# Patient Record
Sex: MALE | Race: WHITE | Employment: FULL TIME | URBAN - NONMETROPOLITAN AREA
[De-identification: names, ages, dates, MRNs, and addresses within clinical notes are randomized per-mention and may not be internally consistent; named-entity substitution may affect disease eponyms.]

---

## 2020-02-15 ENCOUNTER — HOSPITAL ENCOUNTER (EMERGENCY)
Age: 25
Discharge: HOME | End: 2020-02-15
Payer: COMMERCIAL

## 2020-02-15 VITALS
BODY MASS INDEX: 28 KG/M2 | SYSTOLIC BLOOD PRESSURE: 153 MMHG | HEART RATE: 85 BPM | TEMPERATURE: 98.2 F | HEIGHT: 71 IN | WEIGHT: 200 LBS | OXYGEN SATURATION: 99 % | DIASTOLIC BLOOD PRESSURE: 84 MMHG | RESPIRATION RATE: 18 BRPM

## 2020-02-15 PROCEDURE — 6360000002 HC RX W HCPCS: Performed by: NURSE PRACTITIONER

## 2020-02-15 PROCEDURE — 99202 OFFICE O/P NEW SF 15 MIN: CPT

## 2020-02-15 PROCEDURE — 96372 THER/PROPH/DIAG INJ SC/IM: CPT

## 2020-02-15 PROCEDURE — 99213 OFFICE O/P EST LOW 20 MIN: CPT | Performed by: NURSE PRACTITIONER

## 2020-02-15 RX ORDER — METHYLPREDNISOLONE ACETATE 40 MG/ML
40 INJECTION, SUSPENSION INTRA-ARTICULAR; INTRALESIONAL; INTRAMUSCULAR; SOFT TISSUE ONCE
Status: COMPLETED | OUTPATIENT
Start: 2020-02-15 | End: 2020-02-15

## 2020-02-15 RX ORDER — PREDNISONE 20 MG/1
TABLET ORAL
Qty: 21 TABLET | Refills: 0 | Status: SHIPPED | OUTPATIENT
Start: 2020-02-15 | End: 2020-05-19 | Stop reason: ALTCHOICE

## 2020-02-15 RX ADMIN — METHYLPREDNISOLONE ACETATE 40 MG: 40 INJECTION, SUSPENSION INTRA-ARTICULAR; INTRALESIONAL; INTRAMUSCULAR; SOFT TISSUE at 13:35

## 2020-02-15 ASSESSMENT — PAIN DESCRIPTION - DESCRIPTORS: DESCRIPTORS: BURNING;ITCHING

## 2020-02-15 ASSESSMENT — PAIN DESCRIPTION - LOCATION: LOCATION: ARM

## 2020-02-15 ASSESSMENT — ENCOUNTER SYMPTOMS: COLOR CHANGE: 0

## 2020-02-15 ASSESSMENT — PAIN DESCRIPTION - PAIN TYPE: TYPE: ACUTE PAIN

## 2020-02-15 ASSESSMENT — PAIN SCALES - GENERAL: PAINLEVEL_OUTOF10: 3

## 2020-02-15 ASSESSMENT — PAIN DESCRIPTION - ORIENTATION: ORIENTATION: RIGHT;LEFT

## 2020-02-15 NOTE — ED NOTES
Patient verbalized understanding of discharge instructions and medications prescribed. Denies questions or concerns at this time.       Ziyad Griffith RN  02/15/20 6740

## 2020-02-15 NOTE — ED PROVIDER NOTES
05/08/2007    Varicella (Varivax) 08/16/2013       FAMILY HISTORY     Patient's family history is not on file. SOCIAL HISTORY     Patient  reports that he has never smoked. He has never used smokeless tobacco. He reports current alcohol use. He reports that he does not use drugs. PHYSICAL EXAM     ED TRIAGE VITALS  BP: (!) 153/84, Temp: 98.2 °F (36.8 °C), Pulse: 85, Resp: 18, SpO2: 99 %,Estimated body mass index is 27.89 kg/m² as calculated from the following:    Height as of this encounter: 5' 11\" (1.803 m). Weight as of this encounter: 200 lb (90.7 kg). ,No LMP for male patient. Physical Exam  Constitutional:       General: He is not in acute distress. Appearance: Normal appearance. He is not ill-appearing, toxic-appearing or diaphoretic. Pulmonary:      Effort: Pulmonary effort is normal. No respiratory distress. Musculoskeletal: Normal range of motion. General: No swelling. Skin:     General: Skin is warm. Findings: Rash present. Rash is vesicular. Neurological:      General: No focal deficit present. Mental Status: He is alert and oriented to person, place, and time. Sensory: No sensory deficit. Psychiatric:         Mood and Affect: Mood normal.         Behavior: Behavior normal.         Thought Content: Thought content normal.         Judgment: Judgment normal.         DIAGNOSTIC RESULTS     Labs:No results found for this visit on 02/15/20. IMAGING:    No orders to display     URGENT CARE COURSE:     Vitals:    02/15/20 1320   BP: (!) 153/84   Pulse: 85   Resp: 18   Temp: 98.2 °F (36.8 °C)   TempSrc: Temporal   SpO2: 99%   Weight: 200 lb (90.7 kg)   Height: 5' 11\" (1.803 m)       Medications   methylPREDNISolone acetate (DEPO-MEDROL) injection 40 mg (40 mg Intramuscular Given 2/15/20 1335)            PROCEDURES:  None    FINAL IMPRESSION      1. Poison ivy dermatitis    2.  Rash and other nonspecific skin eruption          DISPOSITION/ PLAN   Patient is discharged home with prescription for prednisone tapering dose for the next 2 weeks that he may begin taking today. Patient was given Depo-Medrol injection while at the urgent care today as rash was starting to spread to face. Patient may take Benadryl with prednisone if there is any difficulty in sleeping due to irritation of rash. If rash has not improved or seems to worsen within the next 2-week should follow-up with primary care provider. Discussed with patient, poison ivy cannot be passed from person to person, however if plant oils are still on clothing, they can have reaction to human skin.         PATIENT REFERRED TO:  Raheel Wynne MD  [address] [facility] / [place] [address]      DISCHARGE MEDICATIONS:  New Prescriptions    PREDNISONE (DELTASONE) 20 MG TABLET    40 mg/ day for week 1, 20 mg / day for week 2       Discontinued Medications    No medications on file       Current Discharge Medication List          KOSTA Donaldson NP    (Please note that portions of this note were completed with a voice recognition program. Efforts were made to edit the dictations but occasionally words are mis-transcribed.)         KOSTA Ross NP  02/15/20 5685

## 2020-02-20 ENCOUNTER — TELEPHONE (OUTPATIENT)
Dept: FAMILY MEDICINE CLINIC | Age: 25
End: 2020-02-20

## 2020-05-19 ENCOUNTER — HOSPITAL ENCOUNTER (EMERGENCY)
Age: 25
Discharge: HOME | End: 2020-05-19
Payer: COMMERCIAL

## 2020-05-19 VITALS
DIASTOLIC BLOOD PRESSURE: 79 MMHG | OXYGEN SATURATION: 99 % | BODY MASS INDEX: 28 KG/M2 | HEIGHT: 71 IN | SYSTOLIC BLOOD PRESSURE: 134 MMHG | TEMPERATURE: 97 F | RESPIRATION RATE: 16 BRPM | WEIGHT: 200 LBS | HEART RATE: 71 BPM

## 2020-05-19 PROCEDURE — 99213 OFFICE O/P EST LOW 20 MIN: CPT | Performed by: NURSE PRACTITIONER

## 2020-05-19 PROCEDURE — 99212 OFFICE O/P EST SF 10 MIN: CPT

## 2020-05-19 RX ORDER — GLYCERIN, HYPROMELLOSES, AND POLYETHYLENE GLYCOL 400 2.5; 2; 11.28 MG/ML; MG/ML; MG/ML
1 LIQUID OPHTHALMIC 2 TIMES DAILY
COMMUNITY
End: 2020-10-12

## 2020-05-19 RX ORDER — GENTAMICIN SULFATE 3 MG/ML
1 SOLUTION/ DROPS OPHTHALMIC
Qty: 5 ML | Refills: 0 | Status: SHIPPED | OUTPATIENT
Start: 2020-05-19 | End: 2020-05-24

## 2020-05-19 ASSESSMENT — ENCOUNTER SYMPTOMS
EYE DISCHARGE: 1
NAUSEA: 0
EYE REDNESS: 1
PHOTOPHOBIA: 0
DIARRHEA: 0
EYE PAIN: 1
VOMITING: 0

## 2020-05-19 ASSESSMENT — VISUAL ACUITY
OD: 20/20
OU: 20/15
OS: 20/15

## 2020-05-19 NOTE — ED PROVIDER NOTES
[address]  Urgent Care Encounter       CHIEF COMPLAINT       Chief Complaint   Patient presents with    Eye Drainage       Nurses Notes reviewed and I agree except as noted in the HPI. HISTORY OF PRESENT ILLNESS   Gregorio Varma is a 21 y.o. male who presents with complaints of left eye discharge mild pain, onset 5 to 6 days ago. This morning, the eye became red and swollen. He is reporting some slightly blurred vision to the left eye but no photophobia and no foreign body sensation. The patient denies any injuries or debris in the left eye. No reports of fever, chills, nausea vomiting or recent illnesses. The history is provided by the patient. REVIEW OF SYSTEMS     Review of Systems   Constitutional: Negative for appetite change, chills and fever. Eyes: Positive for pain, discharge, redness and visual disturbance (Slight blurring of the left eye). Negative for photophobia. Gastrointestinal: Negative for diarrhea, nausea and vomiting. Musculoskeletal:        See HPI   Neurological: Negative for dizziness and headaches. PAST MEDICAL HISTORY         Diagnosis Date    Hx MRSA infection 2011       SURGICALHISTORY     Patient  has no past surgical history on file. CURRENT MEDICATIONS       Previous Medications    GLYCERIN-HYPROMELLOSE- (VISINE DRY EYE) 0.2-0.2-1 % SOLN OPTHALMIC SOLUTION    1 drop 2 times daily       ALLERGIES     Patient is is allergic to pcn [penicillins].     Patients   Immunization History   Administered Date(s) Administered    DTaP 01/31/1995, 04/18/1995, 06/20/1995, 03/19/1996, 04/25/2000    HPV Quadrivalent (Gardasil) 08/16/2013, 11/05/2013, 03/05/2014    Hepatitis B 1994, 01/31/1995, 12/05/1995    Hib, unspecified 01/31/1995, 04/18/1995, 06/20/1995, 03/19/1996    MMR 04/25/1995, 12/05/1995    Meningococcal MCV4P (Menactra) 08/16/2013    Polio IPV (IPOL) 04/18/1995, 06/20/1995, 01/31/1996, 04/25/2000    Tdap (Boostrix, Adacel) 05/08/2007    Varicella (Varivax) 08/16/2013       FAMILY HISTORY     Patient's family history is not on file. SOCIAL HISTORY     Patient  reports that he has never smoked. He has never used smokeless tobacco. He reports current alcohol use. He reports that he does not use drugs. PHYSICAL EXAM     ED TRIAGE VITALS  BP: 134/79, Temp: 97 °F (36.1 °C), Pulse: 71, Resp: 16, SpO2: 99 %,Estimated body mass index is 27.89 kg/m² as calculated from the following:    Height as of this encounter: 5' 11\" (1.803 m). Weight as of this encounter: 200 lb (90.7 kg). ,No LMP for male patient. Physical Exam  Vitals signs and nursing note reviewed. Constitutional:       General: He is not in acute distress. Appearance: He is well-developed. He is not ill-appearing. HENT:      Head: Normocephalic and atraumatic. Eyes:      General: No scleral icterus. Left eye: No foreign body, discharge or hordeolum. Extraocular Movements: Extraocular movements intact. Conjunctiva/sclera:      Left eye: Left conjunctiva is injected. No chemosis, exudate or hemorrhage. Pupils: Pupils are equal, round, and reactive to light. Comments: Swelling of the left upper eyelid with mild erythema   Pulmonary:      Effort: Pulmonary effort is normal. No respiratory distress. Skin:     General: Skin is warm and dry. Neurological:      General: No focal deficit present. Mental Status: He is alert and oriented to person, place, and time. Psychiatric:         Mood and Affect: Mood normal.         Speech: Speech normal.         Behavior: Behavior normal. Behavior is cooperative. DIAGNOSTIC RESULTS     Labs:No results found for this visit on 05/19/20.     IMAGING:    No orders to display         EKG:      URGENT CARE COURSE:     Vitals:    05/19/20 1155   BP: 134/79   Pulse: 71   Resp: 16   Temp: 97 °F (36.1 °C)   TempSrc: Temporal   SpO2: 99%   Weight: 200 lb (90.7 kg)   Height: 5' 11\" (1.803 m)

## 2020-05-21 ENCOUNTER — TELEPHONE (OUTPATIENT)
Dept: FAMILY MEDICINE CLINIC | Age: 25
End: 2020-05-21

## 2020-07-02 LAB
ALBUMIN: 4.9
ALP BLD-CCNC: 65 U/L
ALT SERPL-CCNC: 33 U/L
AST SERPL-CCNC: 29 U/L
AVERAGE GLUCOSE: NORMAL
BILIRUB SERPL-MCNC: 0.51 MG/DL (ref 0.1–1.4)
CHOLESTEROL, TOTAL: 199 MG/DL
CHOLESTEROL/HDL RATIO: 6.63
CREATININE: 1 MG/DL
HBA1C MFR BLD: 5.1 %
HDLC SERPL-MCNC: 30 MG/DL (ref 35–70)
HIV AG/AB: NORMAL
LDL CHOLESTEROL CALCULATED: 131 MG/DL (ref 0–160)
NONHDLC SERPL-MCNC: ABNORMAL MG/DL
TRIGL SERPL-MCNC: 186 MG/DL
VLDLC SERPL CALC-MCNC: ABNORMAL MG/DL

## 2020-10-12 ENCOUNTER — HOSPITAL ENCOUNTER (EMERGENCY)
Age: 25
Discharge: HOME | End: 2020-10-12
Payer: COMMERCIAL

## 2020-10-12 VITALS
SYSTOLIC BLOOD PRESSURE: 138 MMHG | DIASTOLIC BLOOD PRESSURE: 82 MMHG | TEMPERATURE: 97.6 F | HEART RATE: 66 BPM | OXYGEN SATURATION: 97 % | RESPIRATION RATE: 16 BRPM

## 2020-10-12 PROCEDURE — 99213 OFFICE O/P EST LOW 20 MIN: CPT | Performed by: NURSE PRACTITIONER

## 2020-10-12 PROCEDURE — 99212 OFFICE O/P EST SF 10 MIN: CPT

## 2020-10-12 RX ORDER — PREDNISONE 10 MG/1
TABLET ORAL
Qty: 40 TABLET | Refills: 0 | Status: SHIPPED | OUTPATIENT
Start: 2020-10-12 | End: 2020-10-22

## 2020-10-12 RX ORDER — DIPHENHYDRAMINE HCL 25 MG
25 CAPSULE ORAL EVERY 6 HOURS PRN
COMMUNITY
End: 2022-04-05

## 2020-10-12 ASSESSMENT — ENCOUNTER SYMPTOMS
SINUS PRESSURE: 0
EYE DISCHARGE: 0
COLOR CHANGE: 1
EYE INFLAMMATION: 1
THROAT SWELLING: 0
DOUBLE VISION: 0
EYE REDNESS: 1
PERI-ORBITAL EDEMA: 0
RHINORRHEA: 0
EYE WATERING: 0
BLURRED VISION: 0
EYE PAIN: 0
PHOTOPHOBIA: 0

## 2020-10-12 ASSESSMENT — PAIN DESCRIPTION - PAIN TYPE: TYPE: ACUTE PAIN

## 2020-10-12 ASSESSMENT — PAIN SCALES - GENERAL: PAINLEVEL_OUTOF10: 2

## 2020-10-12 ASSESSMENT — PAIN DESCRIPTION - FREQUENCY: FREQUENCY: CONTINUOUS

## 2020-10-12 ASSESSMENT — PAIN DESCRIPTION - ORIENTATION: ORIENTATION: RIGHT

## 2020-10-12 ASSESSMENT — PAIN DESCRIPTION - DESCRIPTORS: DESCRIPTORS: ITCHING

## 2020-10-12 ASSESSMENT — PAIN DESCRIPTION - LOCATION: LOCATION: EYE

## 2020-10-12 NOTE — ED TRIAGE NOTES
Patient walked to room 5 for right eyelid swollen and itching. Patient was cleaning up brush on Saturday and yesterday he woke up with his eye all swollen and itching. No other needs.

## 2020-10-12 NOTE — ED PROVIDER NOTES
[address]  Urgent Care Encounter       CHIEF COMPLAINT       Chief Complaint   Patient presents with    Eye Problem     right eye lid swollen, itching onset yesterday        Nurses Notes reviewed and I agree except as noted in the HPI. HISTORY OF PRESENT ILLNESS   Mariela Castro is a 21 y.o. male who presents in care center complaining of a redness and swelling and itching to the right eyelid as well as both forearms. The patient stated that Saturday he was at his home cleaning up brush and was not wearing any gloves stated he did notice that he had a rash little bit on his forearms and his right eyelid was itching. The patient complains irritation 2 on a 10 scale. The patient has not put any medication on any of the rash did take a Benadryl this morning for itching. The patient stated that he \"gets poison ivy every year\". The patient stated that he has had no drainage to the forearms but to the upper eyelid and it was scaly and might of been a slight drainage on the skin. She denied any visual changes any corrective lenses any sensations of foreign body. The history is provided by the patient. No  was used. Eye Problem   Location:  Right eye  Quality: itchy.   Severity:  Mild  Onset quality:  Sudden  Duration:  1 day  Timing:  Constant  Progression:  Unchanged  Chronicity:  New  Context: not chemical exposure, not contact lens problem, not direct trauma, not foreign body, not scratch and not smoke exposure    Context comment:  Plant contact  Relieved by:  None tried  Worsened by:  Nothing  Ineffective treatments:  None tried  Associated symptoms: inflammation and redness    Associated symptoms: no blurred vision, no decreased vision, no discharge, no double vision, no headaches, no photophobia, no swelling and no tearing    Associated symptoms comment:  Right upper eyelid  Risk factors: no exposure to pinkeye and no previous injury to eye    Rash   Location: Face and shoulder/arm  Facial rash location:  R eyelid  Shoulder/arm rash location:  R forearm and L forearm  Quality: itchiness, redness and scaling    Severity:  Mild  Onset quality:  Sudden  Duration:  1 day  Timing:  Constant  Progression:  Waxing and waning  Chronicity:  New  Context: plant contact    Relieved by:  None tried  Worsened by:  Nothing  Ineffective treatments:  None tried  Associated symptoms: no fever, no headaches, no myalgias, no periorbital edema, no throat swelling and no tongue swelling        REVIEW OF SYSTEMS     Review of Systems   Constitutional: Negative for appetite change, chills and fever. HENT: Negative for congestion, rhinorrhea and sinus pressure. Eyes: Positive for redness. Negative for blurred vision, double vision, photophobia, pain and discharge. Right upper eyelid   Musculoskeletal: Negative for myalgias. Skin: Positive for color change and rash. Right upper eyelid, bilateral forearms   Allergic/Immunologic: Negative for environmental allergies and food allergies. Neurological: Negative for dizziness, facial asymmetry, light-headedness and headaches. Hematological: Negative for adenopathy. PAST MEDICAL HISTORY         Diagnosis Date    Hx MRSA infection 2011       SURGICALHISTORY     Patient  has no past surgical history on file. CURRENT MEDICATIONS       Previous Medications    DIPHENHYDRAMINE (BENADRYL) 25 MG CAPSULE    Take 25 mg by mouth every 6 hours as needed for Itching       ALLERGIES     Patient is is allergic to pcn [penicillins].     Patients   Immunization History   Administered Date(s) Administered    DTaP 01/31/1995, 04/18/1995, 06/20/1995, 03/19/1996, 04/25/2000    HPV Quadrivalent (Gardasil) 08/16/2013, 11/05/2013, 03/05/2014    Hepatitis B 1994, 01/31/1995, 12/05/1995    Hib, unspecified 01/31/1995, 04/18/1995, 06/20/1995, 03/19/1996    MMR 04/25/1995, 12/05/1995    Meningococcal MCV4P (Menactra) 08/16/2013    Polio IPV (IPOL) 04/18/1995, 06/20/1995, 01/31/1996, 04/25/2000    Tdap (Boostrix, Adacel) 05/08/2007    Varicella (Varivax) 08/16/2013       FAMILY HISTORY     Patient's family history is not on file. SOCIAL HISTORY     Patient  reports that he has never smoked. He has never used smokeless tobacco. He reports current alcohol use. He reports that he does not use drugs. PHYSICAL EXAM     ED TRIAGE VITALS  BP: 138/82, Temp: 97.6 °F (36.4 °C), Pulse: 66, Resp: 16, SpO2: 97 %,Estimated body mass index is 27.89 kg/m² as calculated from the following:    Height as of 5/19/20: 5' 11\" (1.803 m). Weight as of 5/19/20: 200 lb (90.7 kg). ,No LMP for male patient. Physical Exam  Vitals signs and nursing note reviewed. Constitutional:       General: He is not in acute distress. Appearance: He is well-developed. He is not ill-appearing, toxic-appearing or diaphoretic. HENT:      Head: Normocephalic. Nose: Nose normal.   Eyes:      General:         Right eye: No discharge or hordeolum. Extraocular Movements: Extraocular movements intact. Pupils: Pupils are equal, round, and reactive to light. Comments: Right upper eyelid   Neck:      Musculoskeletal: Normal range of motion. Cardiovascular:      Rate and Rhythm: Normal rate. Pulmonary:      Effort: Pulmonary effort is normal.   Skin:     General: Skin is warm and dry. Coloration: Skin is not pale. Findings: Erythema and rash present. No abrasion, ecchymosis, laceration or petechiae. Rash is papular and vesicular. Rash is not purpuric, pustular or urticarial.             Comments: Right upper eyelid,bilateral forearms   Neurological:      Mental Status: He is alert and oriented to person, place, and time. Psychiatric:         Mood and Affect: Mood normal.         Behavior: Behavior normal. Behavior is cooperative. DIAGNOSTIC RESULTS     Labs:No results found for this visit on 10/12/20.     IMAGING:    No orders to display         EKG:      URGENT CARE COURSE:     Vitals:    10/12/20 1227   BP: 138/82   Pulse: 66   Resp: 16   Temp: 97.6 °F (36.4 °C)   TempSrc: Temporal   SpO2: 97%       Medications - No data to display         PROCEDURES:  None    FINAL IMPRESSION      1. Allergic contact dermatitis due to plants, except food          DISPOSITION/ PLAN       Take Medication as Directed  Benadryl for itch, Don't scratch  Monitor for any increase in size or spreading  Monitor for fever or chills  Keep drainage covered if any.   Follow up with your PCP or return as needed  Or go to the emergency Department    PATIENT REFERRED TO:  Tami Macedo MD  [address] [facility] / [place] [address]:  New Prescriptions    PREDNISONE (DELTASONE) 10 MG TABLET    40 mg's po x 4 days, then 30 mg's po x 4 days, 20 mg's x 4 days, the 10 mgs po x 4 days       Discontinued Medications    GLYCERIN-HYPROMELLOSE- (VISINE DRY EYE) 0.2-0.2-1 % SOLN OPTHALMIC SOLUTION    1 drop 2 times daily       Current Discharge Medication List          KOSTA Anaya CNP    (Please note that portions of this note were completed with a voice recognition program. Efforts were made to edit the dictations but occasionally words are mis-transcribed.)           KOSTA Anaya CNP  10/12/20 4829

## 2020-10-14 ENCOUNTER — TELEPHONE (OUTPATIENT)
Dept: FAMILY MEDICINE CLINIC | Age: 25
End: 2020-10-14

## 2021-04-06 ENCOUNTER — OFFICE VISIT (OUTPATIENT)
Dept: FAMILY MEDICINE CLINIC | Age: 26
End: 2021-04-06
Payer: COMMERCIAL

## 2021-04-06 VITALS
TEMPERATURE: 97.3 F | WEIGHT: 201.3 LBS | SYSTOLIC BLOOD PRESSURE: 128 MMHG | DIASTOLIC BLOOD PRESSURE: 90 MMHG | BODY MASS INDEX: 28.18 KG/M2 | HEART RATE: 78 BPM | OXYGEN SATURATION: 99 % | HEIGHT: 71 IN

## 2021-04-06 DIAGNOSIS — Z00.00 WELL ADULT EXAM: Primary | ICD-10-CM

## 2021-04-06 PROCEDURE — 99395 PREV VISIT EST AGE 18-39: CPT | Performed by: FAMILY MEDICINE

## 2021-04-06 SDOH — HEALTH STABILITY: MENTAL HEALTH: HOW OFTEN DO YOU HAVE A DRINK CONTAINING ALCOHOL?: 4 OR MORE TIMES A WEEK

## 2021-04-06 ASSESSMENT — ENCOUNTER SYMPTOMS
PHOTOPHOBIA: 0
SHORTNESS OF BREATH: 0
SORE THROAT: 0
ABDOMINAL PAIN: 0
WHEEZING: 0
EYE DISCHARGE: 0
NAUSEA: 0

## 2021-04-06 ASSESSMENT — PATIENT HEALTH QUESTIONNAIRE - PHQ9
SUM OF ALL RESPONSES TO PHQ9 QUESTIONS 1 & 2: 0
SUM OF ALL RESPONSES TO PHQ QUESTIONS 1-9: 0
SUM OF ALL RESPONSES TO PHQ QUESTIONS 1-9: 0

## 2021-04-06 NOTE — PATIENT INSTRUCTIONS
Patient Education        Well Visit, Ages 25 to 48: Care Instructions  Overview     Well visits can help you stay healthy. Your doctor has checked your overall health and may have suggested ways to take good care of yourself. Your doctor also may have recommended tests. At home, you can help prevent illness with healthy eating, regular exercise, and other steps. Follow-up care is a key part of your treatment and safety. Be sure to make and go to all appointments, and call your doctor if you are having problems. It's also a good idea to know your test results and keep a list of the medicines you take. How can you care for yourself at home? · Get screening tests that you and your doctor decide on. Screening helps find diseases before any symptoms appear. · Eat healthy foods. Choose fruits, vegetables, whole grains, protein, and low-fat dairy foods. Limit fat, especially saturated fat. Reduce salt in your diet. · Limit alcohol. If you are a man, have no more than 2 drinks a day or 14 drinks a week. If you are a woman, have no more than 1 drink a day or 7 drinks a week. · Get at least 30 minutes of physical activity on most days of the week. Walking is a good choice. You also may want to do other activities, such as running, swimming, cycling, or playing tennis or team sports. Discuss any changes in your exercise program with your doctor. · Reach and stay at a healthy weight. This will lower your risk for many problems, such as obesity, diabetes, heart disease, and high blood pressure. · Do not smoke or allow others to smoke around you. If you need help quitting, talk to your doctor about stop-smoking programs and medicines. These can increase your chances of quitting for good. · Care for your mental health. It is easy to get weighed down by worry and stress. Learn strategies to manage stress, like deep breathing and mindfulness, and stay connected with your family and community.  If you find you often feel sad or hopeless, talk with your doctor. Treatment can help. · Talk to your doctor about whether you have any risk factors for sexually transmitted infections (STIs). You can help prevent STIs if you wait to have sex with a new partner (or partners) until you've each been tested for STIs. It also helps if you use condoms (male or female condoms) and if you limit your sex partners to one person who only has sex with you. Vaccines are available for some STIs, such as HPV. · Use birth control if it's important to you to prevent pregnancy. Talk with your doctor about the choices available and what might be best for you. · If you think you may have a problem with alcohol or drug use, talk to your doctor. This includes prescription medicines (such as amphetamines and opioids) and illegal drugs (such as cocaine and methamphetamine). Your doctor can help you figure out what type of treatment is best for you. · Protect your skin from too much sun. When you're outdoors from 10 a.m. to 4 p.m., stay in the shade or cover up with clothing and a hat with a wide brim. Wear sunglasses that block UV rays. Even when it's cloudy, put broad-spectrum sunscreen (SPF 30 or higher) on any exposed skin. · See a dentist one or two times a year for checkups and to have your teeth cleaned. · Wear a seat belt in the car. When should you call for help? Watch closely for changes in your health, and be sure to contact your doctor if you have any problems or symptoms that concern you. Where can you learn more? Go to https://Babycarerobbin.healthPBS-Bio. org and sign in to your [employer] account. Enter P072 in the [employer] box to learn more about \"Well Visit, Ages 25 to 48: Care Instructions. \"     If you do not have an account, please click on the \"Sign Up Now\" link. Current as of: May 27, 2020               Content Version: 12.8  © 0238-0695 Healthwise, Incorporated. Care instructions adapted under license by [facility] [address]  If you have questions about a medical condition or this instruction, always ask your healthcare professional. [place] [address] any warranty or liability for your use of this information.

## 2021-04-06 NOTE — PROGRESS NOTES
[facility]  [address] [facility]  Dept: [phone]  Dept Fax: [phone]  Loc: [phone]  PROGRESS NOTE    New Patient    Visit Date: 4/6/2021    Zaida Maldonado is a 31 y.o. male who presents today for:  Chief Complaint   Patient presents with   [address] Patient    Annual Exam     well adult exam       Subjective:  HPI    Well adult exam    Exercise:  Not really. Diet:  Nothing particular  Last Dentist appt:  1 year ago  Last optometry appt:  years  Sleep:  good  Mood:  good  Other concerns:       . 1 child. 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 Had labs done for life insurance. Review of Systems   Constitutional: Negative for fever and unexpected weight change. HENT: Negative for ear pain and sore throat. Eyes: Negative for photophobia and discharge. Respiratory: Negative for shortness of breath and wheezing. Cardiovascular: Negative for chest pain and leg swelling. Gastrointestinal: Negative for abdominal pain and nausea. Endocrine: Negative for cold intolerance and heat intolerance. Genitourinary: Negative for dysuria and frequency. Skin: Negative for pallor and rash. Neurological: Negative for syncope and light-headedness. Hematological: Negative for adenopathy. Does not bruise/bleed easily. Psychiatric/Behavioral: Negative for sleep disturbance. The patient is not nervous/anxious. Patient Active Problem List   Diagnosis   (none) - all problems resolved or deleted     Past Medical History:   Diagnosis Date    Hx MRSA infection 2011      History reviewed. No pertinent surgical history. History reviewed. No pertinent family history.   Social History     Tobacco Use    Smoking status: Never Smoker    Smokeless tobacco: Never Used   Substance Use Topics    Alcohol use: Yes     Comment: socially      Current Outpatient Medications   Medication Sig Dispense Refill    diphenhydrAMINE (BENADRYL) 25 MG capsule Take 25 mg by mouth every 6 hours as needed for Itching       No current facility-administered medications for this visit. Allergies   Allergen Reactions    Pcn [Penicillins] Hives and Rash     Health Maintenance   Topic Date Due    Hepatitis C screen  Never done    HIV screen  Never done    COVID-19 Vaccine (1) Never done    Varicella vaccine (2 of 2 - 13+ 2-dose series) 09/13/2013    DTaP/Tdap/Td vaccine (7 - Td) 05/08/2017    Flu vaccine (Season Ended) 09/01/2021    Hepatitis B vaccine  Completed    Hib vaccine  Completed    HPV vaccine  Completed    Meningococcal (ACWY) vaccine  Completed    Hepatitis A vaccine  Aged Out    Pneumococcal 0-64 years Vaccine  Aged Out       Objective:  BP (!) 128/90 (Site: Right Upper Arm, Position: Sitting)   Pulse 78   Temp 97.3 °F (36.3 °C)   Ht 5' 11\" (1.803 m)   Wt 201 lb 4.8 oz (91.3 kg)   SpO2 99%   BMI 28.08 kg/m²   Physical Exam  Vitals signs reviewed. Constitutional:       General: He is not in acute distress. Appearance: He is well-developed. HENT:      Head: Normocephalic and atraumatic. Right Ear: Tympanic membrane, ear canal and external ear normal.      Left Ear: Tympanic membrane, ear canal and external ear normal.      Mouth/Throat:      Mouth: Mucous membranes are moist.      Pharynx: No oropharyngeal exudate. Eyes:      General: No scleral icterus. Right eye: No discharge. Left eye: No discharge. Cardiovascular:      Rate and Rhythm: Normal rate and regular rhythm. Heart sounds: Normal heart sounds. No murmur. No friction rub. No gallop. Pulmonary:      Effort: Pulmonary effort is normal. No respiratory distress. Breath sounds: Normal breath sounds. No wheezing or rhonchi. Abdominal:      General: There is no distension. Palpations: Abdomen is soft. There is no mass.       Tenderness:

## 2021-04-14 ENCOUNTER — NURSE ONLY (OUTPATIENT)
Dept: FAMILY MEDICINE CLINIC | Age: 26
End: 2021-04-14

## 2021-04-14 VITALS — DIASTOLIC BLOOD PRESSURE: 70 MMHG | SYSTOLIC BLOOD PRESSURE: 122 MMHG

## 2021-04-14 DIAGNOSIS — Z01.30 BLOOD PRESSURE CHECK: Primary | ICD-10-CM

## 2022-04-05 ENCOUNTER — OFFICE VISIT (OUTPATIENT)
Dept: FAMILY MEDICINE CLINIC | Age: 27
End: 2022-04-05
Payer: COMMERCIAL

## 2022-04-05 VITALS
BODY MASS INDEX: 27.33 KG/M2 | SYSTOLIC BLOOD PRESSURE: 122 MMHG | OXYGEN SATURATION: 98 % | HEIGHT: 71 IN | HEART RATE: 66 BPM | WEIGHT: 195.2 LBS | RESPIRATION RATE: 14 BRPM | TEMPERATURE: 97.2 F | DIASTOLIC BLOOD PRESSURE: 78 MMHG

## 2022-04-05 DIAGNOSIS — Z23 NEED FOR TDAP VACCINATION: ICD-10-CM

## 2022-04-05 DIAGNOSIS — Z83.2 FAMILY HISTORY OF BLEEDING OR CLOTTING DISORDER: ICD-10-CM

## 2022-04-05 DIAGNOSIS — Z00.00 WELL ADULT EXAM: Primary | ICD-10-CM

## 2022-04-05 PROCEDURE — 90715 TDAP VACCINE 7 YRS/> IM: CPT | Performed by: FAMILY MEDICINE

## 2022-04-05 PROCEDURE — 99395 PREV VISIT EST AGE 18-39: CPT | Performed by: FAMILY MEDICINE

## 2022-04-05 PROCEDURE — 90471 IMMUNIZATION ADMIN: CPT | Performed by: FAMILY MEDICINE

## 2022-04-05 SDOH — ECONOMIC STABILITY: FOOD INSECURITY: WITHIN THE PAST 12 MONTHS, THE FOOD YOU BOUGHT JUST DIDN'T LAST AND YOU DIDN'T HAVE MONEY TO GET MORE.: NEVER TRUE

## 2022-04-05 SDOH — ECONOMIC STABILITY: FOOD INSECURITY: WITHIN THE PAST 12 MONTHS, YOU WORRIED THAT YOUR FOOD WOULD RUN OUT BEFORE YOU GOT MONEY TO BUY MORE.: NEVER TRUE

## 2022-04-05 ASSESSMENT — PATIENT HEALTH QUESTIONNAIRE - PHQ9
SUM OF ALL RESPONSES TO PHQ QUESTIONS 1-9: 0
2. FEELING DOWN, DEPRESSED OR HOPELESS: 0
1. LITTLE INTEREST OR PLEASURE IN DOING THINGS: 0
SUM OF ALL RESPONSES TO PHQ QUESTIONS 1-9: 0
SUM OF ALL RESPONSES TO PHQ9 QUESTIONS 1 & 2: 0
SUM OF ALL RESPONSES TO PHQ QUESTIONS 1-9: 0
SUM OF ALL RESPONSES TO PHQ QUESTIONS 1-9: 0

## 2022-04-05 ASSESSMENT — SOCIAL DETERMINANTS OF HEALTH (SDOH): HOW HARD IS IT FOR YOU TO PAY FOR THE VERY BASICS LIKE FOOD, HOUSING, MEDICAL CARE, AND HEATING?: NOT HARD AT ALL

## 2022-04-05 ASSESSMENT — ENCOUNTER SYMPTOMS
EYE DISCHARGE: 0
PHOTOPHOBIA: 0
ABDOMINAL PAIN: 0
NAUSEA: 0
SHORTNESS OF BREATH: 0
SORE THROAT: 0
WHEEZING: 0

## 2022-04-05 NOTE — PROGRESS NOTES
SRPX  [facility] PROFESSIONAL [facility] MEDICINE  [address] [facility]  Dept: [phone]  Dept Fax: [phone]  Loc: [phone]  PROGRESS NOTE    Visit Date: 4/5/2022    Ita Hoskins is a 31 y.o. male who presents today for:  Chief Complaint   Patient presents with    Annual Exam     well adult exam       Subjective:  HPI    Well adult exam    Exercise:  Not really. Walks a lot at work  Diet:  Nothing particular  Last Dentist appt:  2 years ago  Last optometry appt:  years  Sleep:  good  Mood:  good  Other concerns:       . 1 child. Expecting 2nd baby in may    Works at [employer] side of family with dysfibinogenemia. He wants lab testing    Review of Systems   Constitutional: Negative for fever and unexpected weight change. HENT: Negative for ear pain and sore throat. Eyes: Negative for photophobia and discharge. Respiratory: Negative for shortness of breath and wheezing. Cardiovascular: Negative for chest pain and leg swelling. Gastrointestinal: Negative for abdominal pain and nausea. Endocrine: Negative for cold intolerance and heat intolerance. Genitourinary: Negative for dysuria and frequency. Skin: Negative for pallor and rash. Neurological: Negative for syncope and light-headedness. Hematological: Negative for adenopathy. Does not bruise/bleed easily. Psychiatric/Behavioral: Negative for sleep disturbance. The patient is not nervous/anxious. Patient Active Problem List   Diagnosis   (none) - all problems resolved or deleted     Past Medical History:   Diagnosis Date    Hx MRSA infection 2011      No past surgical history on file. No family history on file.   Social History     Tobacco Use    Smoking status: Never Smoker    Smokeless tobacco: Never Used   Substance Use Topics    Alcohol use: Yes     Comment: socially      Current Outpatient Medications   Medication Sig Dispense Refill  diphenhydrAMINE (BENADRYL) 25 MG capsule Take 25 mg by mouth every 6 hours as needed for Itching (Patient not taking: Reported on 4/5/2022)       No current facility-administered medications for this visit. Allergies   Allergen Reactions    Pcn [Penicillins] Hives and Rash     Health Maintenance   Topic Date Due    Hepatitis C screen  Never done    COVID-19 Vaccine (1) Never done    Varicella vaccine (2 of 2 - 13+ 2-dose series) 09/13/2013    DTaP/Tdap/Td vaccine (7 - Td or Tdap) 05/08/2017    Depression Screen  04/06/2022    Flu vaccine (Season Ended) 09/01/2022    Hepatitis B vaccine  Completed    Hib vaccine  Completed    Meningococcal (ACWY) vaccine  Completed    HIV screen  Completed    Hepatitis A vaccine  Aged Out    Pneumococcal 0-64 years Vaccine  Aged Out       Objective:  /78 (Site: Left Upper Arm, Position: Sitting)   Pulse 66   Temp 97.2 °F (36.2 °C)   Resp 14   Ht 5' 11\" (1.803 m)   Wt 195 lb 3.2 oz (88.5 kg)   SpO2 98%   BMI 27.22 kg/m²   Physical Exam  Vitals reviewed. Constitutional:       General: He is not in acute distress. Appearance: He is well-developed. HENT:      Head: Normocephalic and atraumatic. Right Ear: Tympanic membrane, ear canal and external ear normal.      Left Ear: Tympanic membrane, ear canal and external ear normal.      Mouth/Throat:      Mouth: Mucous membranes are moist.      Pharynx: No oropharyngeal exudate. Eyes:      General: No scleral icterus. Right eye: No discharge. Left eye: No discharge. Cardiovascular:      Rate and Rhythm: Normal rate and regular rhythm. Heart sounds: Normal heart sounds. No murmur heard. No friction rub. No gallop. Pulmonary:      Effort: Pulmonary effort is normal. No respiratory distress. Breath sounds: Normal breath sounds. No wheezing or rhonchi. Abdominal:      General: There is no distension. Palpations: Abdomen is soft. There is no mass. Tenderness: There is no abdominal tenderness. There is no guarding or rebound. Musculoskeletal:      Right lower leg: No edema. Left lower leg: No edema. Skin:     General: Skin is warm. Findings: No erythema or rash. Neurological:      Mental Status: He is alert. Mental status is at baseline. Psychiatric:         Mood and Affect: Mood normal.         Behavior: Behavior normal.         Impression/Plan:  1. Well adult exam  Preventive health maintenance handout provided and discussed  Diet and exercise    2. Family history of bleeding or clotting disorder  Hx of dysfibringinemia  - Thrombin Clot Time; Future  - Fibrinogen; Future  - Miscellaneous Sendout; Future    3. Need for Tdap vaccination  - Tdap (age 6y and older) IM (Raine Evans)    They voiced understanding. All questions answered. They agreed with treatment plan. See patient instructions for any educational materials that may have been given. Discussed use, benefit, and side effects of prescribed medications. Reviewed health maintenance. (Please note that portions of this note may have been completed with a voice recognition program.  Efforts were made to edit the dictation but occasionally words are mis-transcribed.)    Return in about 1 year (around 4/5/2023) for Well.       Electronically signed by Kt Morales MD on 4/5/2022 at 11:28 AM

## 2022-04-05 NOTE — PROGRESS NOTES
Kenneth Yo  1995    1. Are you sick today? no  2. Do you have allergies to medications, food, a vaccine component, or latex? no  3. Have you ever had a serious reaction after receiving a vaccination? no  4. Do you have a long-term health problem with heart, lung, kidney, or metabolic disease (e.g. diabetes), asthma, a blood disorder, no spleen, complement component deficiency, a cochlear implant, or a spinal fluid leak? Are you on long-term aspirin therapy? no  5. Do you have cancer, leukemia, HIV/AIDS, or any other immune system problem? no  6. Do you have a parent, brother, or sister with an immune system problem? no  7. In the past 3 months, have you taken medications that affect your immune system, such as prednisone, other steroids, or anticancer drugs; drugs for the treatment of rheumatoid arthritis, Crohn's disease, or psoriasis; or have you had radiation treatment? no  8. Have you had a seizure or a brain or other nervous system problem? no  9. During the past year, have you received a transfusion of blood or blood products, or been given immune (gamma) globulin or an antiviral drug? no  10. For women: Are you pregnant or is there a chance you could become pregnant during the next month? no  11. Have you received any vaccinations in the past 4 weeks? no    Form Completed by: Olen Skiff, self, on 4/5/2022 at 11:48 AM EDT  Form Reviewed by: Nilda Carroll LPN on 3/7/4323 at 15:98 AM EDT    Did you bring your immunization card with you?  No

## 2022-04-05 NOTE — PATIENT INSTRUCTIONS
Patient Education        Well Visit, Ages 25 to 48: Care Instructions  Overview     Well visits can help you stay healthy. Your doctor has checked your overall health and may have suggested ways to take good care of yourself. Your doctor also may have recommended tests. At home, you can help prevent illness withhealthy eating, regular exercise, and other steps. Follow-up care is a key part of your treatment and safety. Be sure to make and go to all appointments, and call your doctor if you are having problems. It's also a good idea to know your test results and keep alist of the medicines you take. How can you care for yourself at home?  Get screening tests that you and your doctor decide on. Screening helps find diseases before any symptoms appear.  Eat healthy foods. Choose fruits, vegetables, whole grains, protein, and low-fat dairy foods. Limit fat, especially saturated fat. Reduce salt in your diet.  Limit alcohol. If you are a man, have no more than 2 drinks a day or 14 drinks a week. If you are a woman, have no more than 1 drink a day or 7 drinks a week.  Get at least 30 minutes of physical activity on most days of the week. Walking is a good choice. You also may want to do other activities, such as running, swimming, cycling, or playing tennis or team sports. Discuss any changes in your exercise program with your doctor.  Reach and stay at a healthy weight. This will lower your risk for many problems, such as obesity, diabetes, heart disease, and high blood pressure.  Do not smoke or allow others to smoke around you. If you need help quitting, talk to your doctor about stop-smoking programs and medicines. These can increase your chances of quitting for good.  Care for your mental health. It is easy to get weighed down by worry and stress. Learn strategies to manage stress, like deep breathing and mindfulness, and stay connected with your family and community.  If you find you often feel sad or hopeless, talk with your doctor. Treatment can help.  Talk to your doctor about whether you have any risk factors for sexually transmitted infections (STIs). You can help prevent STIs if you wait to have sex with a new partner (or partners) until you've each been tested for STIs. It also helps if you use condoms (male or female condoms) and if you limit your sex partners to one person who only has sex with you. Vaccines are available for some STIs, such as HPV.  Use birth control if it's important to you to prevent pregnancy. Talk with your doctor about the choices available and what might be best for you.  If you think you may have a problem with alcohol or drug use, talk to your doctor. This includes prescription medicines (such as amphetamines and opioids) and illegal drugs (such as cocaine and methamphetamine). Your doctor can help you figure out what type of treatment is best for you.  Protect your skin from too much sun. When you're outdoors from 10 a.m. to 4 p.m., stay in the shade or cover up with clothing and a hat with a wide brim. Wear sunglasses that block UV rays. Even when it's cloudy, put broad-spectrum sunscreen (SPF 30 or higher) on any exposed skin.  See a dentist one or two times a year for checkups and to have your teeth cleaned.  Wear a seat belt in the car. When should you call for help? Watch closely for changes in your health, and be sure to contact your doctor if you have any problems or symptoms that concern you. Where can you learn more? Go to [email or web address].healthSingOn. org and sign in to your [employer] account. Enter P072 in the [facility] box to learn more about \"Well Visit, Ages 25 to 48: Care Instructions. \"     If you do not have an account, please click on the \"Sign Up Now\" link. Current as of: October 6, 2021               Content Version: 13.2  © 7360-0396 Healthwise, Incorporated. Care instructions adapted under license by [facility] ([facility]).  If you have questions about a medical condition or this instruction, always ask your healthcare professional. [place] [address] any warranty or liability for your use of this information.

## 2022-04-05 NOTE — PROGRESS NOTES
Immunization(s) given during visit:    Immunizations Administered     Name Date Dose Route    Tdap (Boostrix, Adacel) 4/5/2022 0.5 mL Intramuscular    Site: Deltoid- Right    Lot: [id number]    NDC: 65861-583-65          Most recent Vaccine Information Sheet dated 8/6/2021 given to vincent

## 2022-09-19 ENCOUNTER — HOSPITAL ENCOUNTER (OUTPATIENT)
Age: 27
Discharge: HOME | End: 2022-09-19
Payer: COMMERCIAL

## 2022-09-19 DIAGNOSIS — Z83.2 FAMILY HISTORY OF BLEEDING OR CLOTTING DISORDER: ICD-10-CM

## 2022-09-19 PROCEDURE — 85384 FIBRINOGEN ACTIVITY: CPT

## 2022-09-19 PROCEDURE — 85670 THROMBIN TIME PLASMA: CPT

## 2022-09-19 PROCEDURE — 85385 FIBRINOGEN ANTIGEN: CPT

## 2022-09-19 PROCEDURE — 36415 COLL VENOUS BLD VENIPUNCTURE: CPT

## 2022-09-20 ENCOUNTER — TELEPHONE (OUTPATIENT)
Dept: FAMILY MEDICINE CLINIC | Age: 27
End: 2022-09-20

## 2022-09-20 DIAGNOSIS — D68.2 FIBRINOGEN DECREASED (HCC): ICD-10-CM

## 2022-09-20 DIAGNOSIS — D68.2 DYSFIBRINOGENEMIA (HCC): Primary | ICD-10-CM

## 2022-09-20 LAB — FIBRINOGEN: 44 MG/100ML (ref 155–475)

## 2022-09-20 NOTE — TELEPHONE ENCOUNTER
Shagufta Palomo with [facility] calling:    Critical lab    Fibrinogen 44- critical low    Please advise.

## 2022-09-20 NOTE — TELEPHONE ENCOUNTER
Fibrinogen level is low. Awaiting fibrinogen antigen and other testing that was ordered. 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 Recommend hematology referral.  Children and patient's siblings will need tested as well. Please advise patient.   Summer Guzman MD

## 2022-09-24 LAB — THROMBIN TIME: NORMAL

## 2022-09-25 LAB — MISC. #1 REFERENCE GROUP TEST: NORMAL

## 2022-10-05 ENCOUNTER — TELEPHONE (OUTPATIENT)
Dept: ONCOLOGY | Age: 27
End: 2022-10-05

## 2022-10-05 NOTE — TELEPHONE ENCOUNTER
Spoke with patient to schedule a new pt appt and patient stated that he is going to decline the referral.

## 2023-04-06 ENCOUNTER — OFFICE VISIT (OUTPATIENT)
Dept: FAMILY MEDICINE CLINIC | Age: 28
End: 2023-04-06
Payer: COMMERCIAL

## 2023-04-06 VITALS
BODY MASS INDEX: 28.45 KG/M2 | TEMPERATURE: 97.9 F | OXYGEN SATURATION: 99 % | SYSTOLIC BLOOD PRESSURE: 138 MMHG | WEIGHT: 203.2 LBS | RESPIRATION RATE: 14 BRPM | HEIGHT: 71 IN | HEART RATE: 74 BPM | DIASTOLIC BLOOD PRESSURE: 82 MMHG

## 2023-04-06 DIAGNOSIS — Z00.00 WELL ADULT EXAM: Primary | ICD-10-CM

## 2023-04-06 DIAGNOSIS — D68.2 DYSFIBRINOGENEMIA (HCC): ICD-10-CM

## 2023-04-06 PROCEDURE — 99395 PREV VISIT EST AGE 18-39: CPT | Performed by: FAMILY MEDICINE

## 2023-04-06 SDOH — ECONOMIC STABILITY: FOOD INSECURITY: WITHIN THE PAST 12 MONTHS, YOU WORRIED THAT YOUR FOOD WOULD RUN OUT BEFORE YOU GOT MONEY TO BUY MORE.: NEVER TRUE

## 2023-04-06 SDOH — ECONOMIC STABILITY: HOUSING INSECURITY
IN THE LAST 12 MONTHS, WAS THERE A TIME WHEN YOU DID NOT HAVE A STEADY PLACE TO SLEEP OR SLEPT IN A SHELTER (INCLUDING NOW)?: NO

## 2023-04-06 SDOH — ECONOMIC STABILITY: FOOD INSECURITY: WITHIN THE PAST 12 MONTHS, THE FOOD YOU BOUGHT JUST DIDN'T LAST AND YOU DIDN'T HAVE MONEY TO GET MORE.: NEVER TRUE

## 2023-04-06 SDOH — ECONOMIC STABILITY: INCOME INSECURITY: HOW HARD IS IT FOR YOU TO PAY FOR THE VERY BASICS LIKE FOOD, HOUSING, MEDICAL CARE, AND HEATING?: NOT HARD AT ALL

## 2023-04-06 ASSESSMENT — ENCOUNTER SYMPTOMS
NAUSEA: 0
ABDOMINAL PAIN: 0
SHORTNESS OF BREATH: 0
EYE DISCHARGE: 0
SORE THROAT: 0
WHEEZING: 0
PHOTOPHOBIA: 0

## 2023-04-06 ASSESSMENT — PATIENT HEALTH QUESTIONNAIRE - PHQ9
SUM OF ALL RESPONSES TO PHQ QUESTIONS 1-9: 0
SUM OF ALL RESPONSES TO PHQ QUESTIONS 1-9: 0
SUM OF ALL RESPONSES TO PHQ9 QUESTIONS 1 & 2: 0
SUM OF ALL RESPONSES TO PHQ QUESTIONS 1-9: 0
2. FEELING DOWN, DEPRESSED OR HOPELESS: 0
1. LITTLE INTEREST OR PLEASURE IN DOING THINGS: 0
SUM OF ALL RESPONSES TO PHQ QUESTIONS 1-9: 0

## 2023-04-06 NOTE — PROGRESS NOTES
[facility]  [address] [facility]  Dept: [phone]  Dept Fax: [phone]  Loc: [phone]  PROGRESS NOTE    Visit Date: 4/6/2023    Kwabena Phillips is a 28 y.o. male who presents today for:  Chief Complaint   Patient presents with    Annual Exam     Denies any issues or concerns        Subjective:  HPI    Well adult exam    Exercise:  Walks a lot at work  Diet:  Nothing particular  Last Dentist appt:  twice yearly  Last optometry appt:  years ago  Sleep:  good  Mood:  good  Other concerns:       Has dysfibrinogenemia. . 2 children. Works at [employer]      Review of Systems   Constitutional:  Negative for fever and unexpected weight change. HENT:  Negative for ear pain and sore throat. Eyes:  Negative for photophobia and discharge. Respiratory:  Negative for shortness of breath and wheezing. Cardiovascular:  Negative for chest pain and leg swelling. Gastrointestinal:  Negative for abdominal pain and nausea. Endocrine: Negative for cold intolerance and heat intolerance. Genitourinary:  Negative for dysuria and frequency. Skin:  Negative for pallor and rash. Neurological:  Negative for syncope and light-headedness. Hematological:  Negative for adenopathy. Does not bruise/bleed easily. Psychiatric/Behavioral:  Negative for sleep disturbance. The patient is not nervous/anxious. Patient Active Problem List   Diagnosis    Family history of bleeding or clotting disorder    Fibrinogen decreased ([address]    Dysfibrinogenemia (HCC)     Past Medical History:   Diagnosis Date    Hx MRSA infection 2011      No past surgical history on file. No family history on file. Social History     Tobacco Use    Smoking status: Never    Smokeless tobacco: Never   Substance Use Topics    Alcohol use: Yes     Comment: socially      No current outpatient medications on file.      No current

## 2024-04-08 ENCOUNTER — OFFICE VISIT (OUTPATIENT)
Dept: FAMILY MEDICINE CLINIC | Age: 29
End: 2024-04-08
Payer: COMMERCIAL

## 2024-04-08 VITALS
HEART RATE: 87 BPM | TEMPERATURE: 97.5 F | BODY MASS INDEX: 29.26 KG/M2 | RESPIRATION RATE: 18 BRPM | SYSTOLIC BLOOD PRESSURE: 128 MMHG | WEIGHT: 209 LBS | HEIGHT: 71 IN | OXYGEN SATURATION: 98 % | DIASTOLIC BLOOD PRESSURE: 86 MMHG

## 2024-04-08 DIAGNOSIS — R03.0 ELEVATED BP WITHOUT DIAGNOSIS OF HYPERTENSION: ICD-10-CM

## 2024-04-08 DIAGNOSIS — Z00.00 WELL ADULT EXAM: Primary | ICD-10-CM

## 2024-04-08 DIAGNOSIS — R13.19 OTHER DYSPHAGIA: ICD-10-CM

## 2024-04-08 DIAGNOSIS — K21.00 GASTROESOPHAGEAL REFLUX DISEASE WITH ESOPHAGITIS WITHOUT HEMORRHAGE: ICD-10-CM

## 2024-04-08 PROCEDURE — 99395 PREV VISIT EST AGE 18-39: CPT | Performed by: FAMILY MEDICINE

## 2024-04-08 RX ORDER — OMEPRAZOLE 20 MG/1
20 CAPSULE, DELAYED RELEASE ORAL DAILY
COMMUNITY

## 2024-04-08 SDOH — ECONOMIC STABILITY: FOOD INSECURITY: WITHIN THE PAST 12 MONTHS, THE FOOD YOU BOUGHT JUST DIDN'T LAST AND YOU DIDN'T HAVE MONEY TO GET MORE.: NEVER TRUE

## 2024-04-08 SDOH — ECONOMIC STABILITY: INCOME INSECURITY: HOW HARD IS IT FOR YOU TO PAY FOR THE VERY BASICS LIKE FOOD, HOUSING, MEDICAL CARE, AND HEATING?: NOT HARD AT ALL

## 2024-04-08 SDOH — ECONOMIC STABILITY: FOOD INSECURITY: WITHIN THE PAST 12 MONTHS, YOU WORRIED THAT YOUR FOOD WOULD RUN OUT BEFORE YOU GOT MONEY TO BUY MORE.: NEVER TRUE

## 2024-04-08 ASSESSMENT — PATIENT HEALTH QUESTIONNAIRE - PHQ9
1. LITTLE INTEREST OR PLEASURE IN DOING THINGS: NOT AT ALL
SUM OF ALL RESPONSES TO PHQ QUESTIONS 1-9: 0
SUM OF ALL RESPONSES TO PHQ QUESTIONS 1-9: 0
SUM OF ALL RESPONSES TO PHQ9 QUESTIONS 1 & 2: 0
SUM OF ALL RESPONSES TO PHQ QUESTIONS 1-9: 0
SUM OF ALL RESPONSES TO PHQ QUESTIONS 1-9: 0
2. FEELING DOWN, DEPRESSED OR HOPELESS: NOT AT ALL

## 2024-04-08 ASSESSMENT — ENCOUNTER SYMPTOMS
SHORTNESS OF BREATH: 0
COUGH: 0
NAUSEA: 0
CONSTIPATION: 0
ABDOMINAL PAIN: 0
DIARRHEA: 0

## 2024-04-08 NOTE — PROGRESS NOTES
Attending Supervising Physician's Attestation Statement  The patient is a 28 y.o. male. I have performed a history and physical examination of the patient. I discussed the case with the resident physician.    I reviewed the patient's Past Medical History, Past Surgical History, Medications, and Allergies.     Physical Exam:  Vitals:    04/08/24 1115 04/08/24 1146   BP: (!) 138/90 128/86   Site: Left Upper Arm Left Upper Arm   Position: Sitting Sitting   Pulse: 87    Resp: 18    Temp: 97.5 °F (36.4 °C)    SpO2: 98%    Weight: 94.8 kg (209 lb)    Height: 1.803 m (5' 11\")          Gen:  No distress.  Well developed and well nourished.  Cardiac: Regular rate and rhythm.  No murmur  Pulmonary: Clear to auscultation bilaterally.  No wheezes, rales, or rhonchi.  No respiratory distress  Neurological: Alert.  Psych: Normal mood and affect.  Normal attention.  Normal behavior      Impression/Plan  I reviewed and agree with the findings and plan documented in his note .     Diagnosis Orders   1. Well adult exam        2. Elevated BP without diagnosis of hypertension        3. Gastroesophageal reflux disease with esophagitis without hemorrhage  Nhan Tucker MD, Gastroenterology, [place]      4. Other dysphagia  SANDI - Nhan Moseley MD, Gastroenterology, [place]        Well Adult exam.  Discussed health maintenance.  Continue healthy diet and physical activity    Blood pressure controlled on recheck    GERD with dysphagia: Continue Prilosec.  Referral to GI for possible EGD    Electronically signed by Rahul Headley MD on 4/8/24 at 12:09 PM EDT

## 2024-04-08 NOTE — PROGRESS NOTES
SRPX [facility] PROFESSIONAL [facility] MEDICINE  [address]  [facility] [address]  Dept: [phone]  Dept Fax: [phone]  Loc: [phone]      Minor Alexander is a 28 y.o. male who presents todayfor his medical conditions/complaints as noted below.  Minor Alexander is c/o of Annual Exam and Gastroesophageal Reflux      :     Well adult exam     Exercise:  Walks a lot at work, will workout in AM with wife 4 times a week, will use FitOn on smart TV. Started doing this routine around 1 month.   Diet:  Nothing particular, does not eat fast food that often, will cook at home mostly, raises pork and beef.   Last Dentist appt:  once a year  Last optometry appt:  years ago, will be seen by them soon  Sleep:  good  Mood:  good    Other concerns:       GERD: When he sits down to eat, will have food get stuck and this sensation is near his lower esophageal sphincter. Is on Omeprazole, has been on this for a couple of months. This problem has been going on for one year. Omeprazole will help make this better, when not taking this is clearly worse.      BP elevated: Does not check BP at home, did have coffee this AM prior to arrival. Recheck normal     Has dysfibrinogenemia, no acute concerns with this     .  2 children.      Works at [employer]    Patient Active Problem List   Diagnosis    Family history of bleeding or clotting disorder    Fibrinogen decreased (HCC)    Dysfibrinogenemia (HCC)    Gastroesophageal reflux disease with esophagitis without hemorrhage    Other dysphagia      Goals    None       The patient is allergic to pcn [penicillins].    Medical History  Minor TRIPLETT has a past medical history of Hx MRSA infection.    Past SurgicalHistory  The patient  has no past surgical history on file.    Family History  This patient's family history is not on file.    Social History  Minor TRIPLETT  reports that he has never smoked. He has never used smokeless tobacco. He reports

## 2025-04-09 ENCOUNTER — OFFICE VISIT (OUTPATIENT)
Dept: FAMILY MEDICINE CLINIC | Age: 30
End: 2025-04-09
Payer: COMMERCIAL

## 2025-04-09 VITALS
SYSTOLIC BLOOD PRESSURE: 132 MMHG | HEIGHT: 71 IN | WEIGHT: 203 LBS | RESPIRATION RATE: 18 BRPM | HEART RATE: 65 BPM | BODY MASS INDEX: 28.42 KG/M2 | OXYGEN SATURATION: 98 % | DIASTOLIC BLOOD PRESSURE: 84 MMHG

## 2025-04-09 DIAGNOSIS — Z13.220 SCREENING FOR HYPERLIPIDEMIA: ICD-10-CM

## 2025-04-09 DIAGNOSIS — K21.00 GASTROESOPHAGEAL REFLUX DISEASE WITH ESOPHAGITIS WITHOUT HEMORRHAGE: ICD-10-CM

## 2025-04-09 DIAGNOSIS — Z00.00 WELL ADULT EXAM: Primary | ICD-10-CM

## 2025-04-09 DIAGNOSIS — Z13.1 DIABETES MELLITUS SCREENING: ICD-10-CM

## 2025-04-09 PROBLEM — R13.19 OTHER DYSPHAGIA: Status: RESOLVED | Noted: 2024-04-08 | Resolved: 2025-04-09

## 2025-04-09 PROCEDURE — 99395 PREV VISIT EST AGE 18-39: CPT | Performed by: FAMILY MEDICINE

## 2025-04-09 SDOH — ECONOMIC STABILITY: FOOD INSECURITY: WITHIN THE PAST 12 MONTHS, THE FOOD YOU BOUGHT JUST DIDN'T LAST AND YOU DIDN'T HAVE MONEY TO GET MORE.: NEVER TRUE

## 2025-04-09 SDOH — ECONOMIC STABILITY: FOOD INSECURITY: WITHIN THE PAST 12 MONTHS, YOU WORRIED THAT YOUR FOOD WOULD RUN OUT BEFORE YOU GOT MONEY TO BUY MORE.: NEVER TRUE

## 2025-04-09 ASSESSMENT — PATIENT HEALTH QUESTIONNAIRE - PHQ9
2. FEELING DOWN, DEPRESSED OR HOPELESS: NOT AT ALL
SUM OF ALL RESPONSES TO PHQ QUESTIONS 1-9: 0
1. LITTLE INTEREST OR PLEASURE IN DOING THINGS: NOT AT ALL
SUM OF ALL RESPONSES TO PHQ QUESTIONS 1-9: 0

## 2025-04-09 ASSESSMENT — ENCOUNTER SYMPTOMS
NAUSEA: 0
WHEEZING: 0
PHOTOPHOBIA: 0
SORE THROAT: 0
ABDOMINAL PAIN: 0
SHORTNESS OF BREATH: 0
EYE DISCHARGE: 0

## 2025-04-09 NOTE — PROGRESS NOTES
SRPX [facility] PROFESSIONAL [facility] MEDICINE  [address]  [facility] [address]  Dept: [phone]  Dept Fax: [phone]  Loc: [phone]  PROGRESS NOTE      Visit Date: 4/9/2025    Minor Alexander is a 29 y.o. male who presents today for:  Chief Complaint   Patient presents with    Annual Exam       Chief complaint:  physical    Subjective:  HPI    Well adult exam    Exercise:  walking  Diet:  nothing particular  Last Dentist appt:  yearly  Last optometry appt:  needs to schedule  Sleep:  good  Mood:  good  Other concerns:       Seen by GI this past year.  No EGD was done. On prilosec.  No problems swallowing.       2 children    Review of Systems   Constitutional:  Negative for fever and unexpected weight change.   HENT:  Negative for ear pain and sore throat.    Eyes:  Negative for photophobia and discharge.   Respiratory:  Negative for shortness of breath and wheezing.    Cardiovascular:  Negative for chest pain and leg swelling.   Gastrointestinal:  Negative for abdominal pain and nausea.   Endocrine: Negative for cold intolerance and heat intolerance.   Genitourinary:  Negative for dysuria and frequency.   Skin:  Negative for pallor and rash.   Neurological:  Negative for syncope and light-headedness.   Hematological:  Negative for adenopathy. Does not bruise/bleed easily.   Psychiatric/Behavioral:  Negative for sleep disturbance. The patient is not nervous/anxious.      Patient Active Problem List   Diagnosis    Family history of bleeding or clotting disorder    Fibrinogen decreased (HCC)    Dysfibrinogenemia (HCC)    Gastroesophageal reflux disease with esophagitis without hemorrhage    Other dysphagia     Past Medical History:   Diagnosis Date    Hx MRSA infection 2011      History reviewed. No pertinent surgical history.  History reviewed. No pertinent family history.  Social History     Tobacco Use    Smoking status: Never    Smokeless tobacco: Never

## 2025-08-05 RX ORDER — OMEPRAZOLE 20 MG/1
20 CAPSULE, DELAYED RELEASE ORAL DAILY
Qty: 90 CAPSULE | Refills: 2 | Status: SHIPPED | OUTPATIENT
Start: 2025-08-05